# Patient Record
Sex: FEMALE | Race: BLACK OR AFRICAN AMERICAN | URBAN - METROPOLITAN AREA
[De-identification: names, ages, dates, MRNs, and addresses within clinical notes are randomized per-mention and may not be internally consistent; named-entity substitution may affect disease eponyms.]

---

## 2020-11-11 ENCOUNTER — OFFICE VISIT (OUTPATIENT)
Dept: URBAN - METROPOLITAN AREA CLINIC 92 | Facility: CLINIC | Age: 40
End: 2020-11-11
Payer: COMMERCIAL

## 2020-11-11 DIAGNOSIS — R19.5 LOOSE STOOLS: ICD-10-CM

## 2020-11-11 DIAGNOSIS — K62.5 RECTAL BLEEDING: ICD-10-CM

## 2020-11-11 DIAGNOSIS — R10.30 LOWER ABDOMINAL PAIN: ICD-10-CM

## 2020-11-11 DIAGNOSIS — Z86.2 HISTORY OF ANEMIA: ICD-10-CM

## 2020-11-11 DIAGNOSIS — R10.11 RUQ ABDOMINAL PAIN: ICD-10-CM

## 2020-11-11 PROCEDURE — G8417 CALC BMI ABV UP PARAM F/U: HCPCS | Performed by: PHYSICIAN ASSISTANT

## 2020-11-11 PROCEDURE — G9903 PT SCRN TBCO ID AS NON USER: HCPCS | Performed by: PHYSICIAN ASSISTANT

## 2020-11-11 PROCEDURE — G8482 FLU IMMUNIZE ORDER/ADMIN: HCPCS | Performed by: PHYSICIAN ASSISTANT

## 2020-11-11 PROCEDURE — G8427 DOCREV CUR MEDS BY ELIG CLIN: HCPCS | Performed by: PHYSICIAN ASSISTANT

## 2020-11-11 PROCEDURE — 99214 OFFICE O/P EST MOD 30 MIN: CPT | Performed by: PHYSICIAN ASSISTANT

## 2020-11-11 RX ORDER — HYOSCYAMINE SULFATE 0.12 MG/1
1 TABLET UNDER THE TONGUE AND ALLOW TO DISSOLVE  AS NEEDED TABLET, ORALLY DISINTEGRATING ORAL THREE TIMES A DAY
Qty: 60 | Refills: 0 | OUTPATIENT

## 2020-11-11 RX ORDER — SODIUM, POTASSIUM,MAG SULFATES 17.5-3.13G
177ML SOLUTION, RECONSTITUTED, ORAL ORAL
Qty: 1 | Refills: 0 | OUTPATIENT
Start: 2020-11-11

## 2020-11-11 NOTE — PHYSICAL EXAM GASTROINTESTINAL
Abdomen , soft, lower abd tenderness, nondistended , no guarding or rigidity , no masses palpable , normal bowel sounds , no hepatomegaly present

## 2020-11-12 ENCOUNTER — OFFICE VISIT (OUTPATIENT)
Dept: URBAN - METROPOLITAN AREA CLINIC 91 | Facility: CLINIC | Age: 40
End: 2020-11-12
Payer: COMMERCIAL

## 2020-11-12 DIAGNOSIS — R10.11 RUQ ABDOMINAL PAIN: ICD-10-CM

## 2020-11-12 LAB
A/G RATIO: 1.7
ALBUMIN: 5
ALKALINE PHOSPHATASE: 57
ALT (SGPT): 14
AST (SGOT): 21
BILIRUBIN, TOTAL: 0.3
BUN/CREATININE RATIO: 8
BUN: 6
CALCIUM: 9.9
CARBON DIOXIDE, TOTAL: 20
CHLORIDE: 103
CREATININE: 0.75
EGFR IF AFRICN AM: 117
EGFR IF NONAFRICN AM: 101
FERRITIN, SERUM: 64
GLOBULIN, TOTAL: 3
GLUCOSE: 90
HEMATOCRIT: 44.3
HEMOGLOBIN: 14.6
IRON BIND.CAP.(TIBC): 415
IRON SATURATION: 25
IRON: 102
MCH: 27.5
MCHC: 33
MCV: 84
NRBC: (no result)
PLATELETS: 242
POTASSIUM: 4.3
PROTEIN, TOTAL: 8
RBC: 5.3
RDW: 13.3
SODIUM: 139
TSH: 2.38
UIBC: 313
WBC: 7

## 2020-11-12 PROCEDURE — 76705 ECHO EXAM OF ABDOMEN: CPT | Performed by: INTERNAL MEDICINE

## 2020-11-12 RX ORDER — HYOSCYAMINE SULFATE 0.12 MG/1
1 TABLET UNDER THE TONGUE AND ALLOW TO DISSOLVE  AS NEEDED TABLET, ORALLY DISINTEGRATING ORAL THREE TIMES A DAY
Qty: 60 | Refills: 0 | Status: ACTIVE | COMMUNITY

## 2020-11-12 RX ORDER — SODIUM, POTASSIUM,MAG SULFATES 17.5-3.13G
177ML SOLUTION, RECONSTITUTED, ORAL ORAL
Qty: 1 | Refills: 0 | Status: ACTIVE | COMMUNITY
Start: 2020-11-11

## 2020-11-13 ENCOUNTER — CLAIMS CREATED FROM THE CLAIM WINDOW (OUTPATIENT)
Dept: URBAN - METROPOLITAN AREA CLINIC 4 | Facility: CLINIC | Age: 40
End: 2020-11-13
Payer: COMMERCIAL

## 2020-11-13 ENCOUNTER — OFFICE VISIT (OUTPATIENT)
Dept: URBAN - METROPOLITAN AREA SURGERY CENTER 16 | Facility: SURGERY CENTER | Age: 40
End: 2020-11-13
Payer: COMMERCIAL

## 2020-11-13 DIAGNOSIS — K63.89 OTHER SPECIFIED DISEASES OF INTESTINE: ICD-10-CM

## 2020-11-13 DIAGNOSIS — R19.4 ALTERED BOWEL HABITS: ICD-10-CM

## 2020-11-13 DIAGNOSIS — K92.1 BLACK STOOL: ICD-10-CM

## 2020-11-13 PROCEDURE — 88305 TISSUE EXAM BY PATHOLOGIST: CPT | Performed by: PATHOLOGY

## 2020-11-13 PROCEDURE — G9937 DIG OR SURV COLSCO: HCPCS | Performed by: INTERNAL MEDICINE

## 2020-11-13 PROCEDURE — 45380 COLONOSCOPY AND BIOPSY: CPT | Performed by: INTERNAL MEDICINE

## 2020-11-13 PROCEDURE — G8907 PT DOC NO EVENTS ON DISCHARG: HCPCS | Performed by: INTERNAL MEDICINE

## 2020-11-13 RX ORDER — HYOSCYAMINE SULFATE 0.12 MG/1
1 TABLET UNDER THE TONGUE AND ALLOW TO DISSOLVE  AS NEEDED TABLET, ORALLY DISINTEGRATING ORAL THREE TIMES A DAY
Qty: 60 | Refills: 0 | Status: ACTIVE | COMMUNITY

## 2020-11-13 RX ORDER — SODIUM, POTASSIUM,MAG SULFATES 17.5-3.13G
177ML SOLUTION, RECONSTITUTED, ORAL ORAL
Qty: 1 | Refills: 0 | Status: ACTIVE | COMMUNITY
Start: 2020-11-11

## 2020-11-24 ENCOUNTER — TELEPHONE ENCOUNTER (OUTPATIENT)
Dept: URBAN - METROPOLITAN AREA CLINIC 92 | Facility: CLINIC | Age: 40
End: 2020-11-24

## 2020-11-30 ENCOUNTER — LAB OUTSIDE AN ENCOUNTER (OUTPATIENT)
Dept: URBAN - METROPOLITAN AREA CLINIC 92 | Facility: CLINIC | Age: 40
End: 2020-11-30

## 2020-12-01 ENCOUNTER — LAB OUTSIDE AN ENCOUNTER (OUTPATIENT)
Dept: URBAN - METROPOLITAN AREA CLINIC 92 | Facility: CLINIC | Age: 40
End: 2020-12-01

## 2020-12-02 ENCOUNTER — TELEPHONE ENCOUNTER (OUTPATIENT)
Dept: URBAN - METROPOLITAN AREA CLINIC 92 | Facility: CLINIC | Age: 40
End: 2020-12-02

## 2020-12-02 LAB — H PYLORI BREATH TEST: NEGATIVE

## 2020-12-02 RX ORDER — OMEPRAZOLE 40 MG/1
1 CAPSULE 30 MINUTES BEFORE MORNING MEAL CAPSULE, DELAYED RELEASE ORAL ONCE A DAY
Qty: 90 | Refills: 3 | OUTPATIENT
Start: 2020-12-02

## 2020-12-07 LAB
GASTROINTESTINAL PATHOGEN: (no result)
OVA AND PARASITE - QDX: (no result)
PANCREATICELASTASE ELISA, STOOL: (no result)

## 2020-12-08 ENCOUNTER — TELEPHONE ENCOUNTER (OUTPATIENT)
Dept: URBAN - METROPOLITAN AREA CLINIC 92 | Facility: CLINIC | Age: 40
End: 2020-12-08

## 2020-12-08 ENCOUNTER — OFFICE VISIT (OUTPATIENT)
Dept: URBAN - METROPOLITAN AREA TELEHEALTH 2 | Facility: TELEHEALTH | Age: 40
End: 2020-12-08
Payer: COMMERCIAL

## 2020-12-08 ENCOUNTER — OFFICE VISIT (OUTPATIENT)
Dept: URBAN - METROPOLITAN AREA TELEHEALTH 2 | Facility: TELEHEALTH | Age: 40
End: 2020-12-08

## 2020-12-08 DIAGNOSIS — R63.4 WEIGHT LOSS: ICD-10-CM

## 2020-12-08 DIAGNOSIS — K58.0 IRRITABLE BOWEL SYNDROME WITH DIARRHEA: ICD-10-CM

## 2020-12-08 DIAGNOSIS — R10.84 GENERALIZED ABDOMINAL PAIN: ICD-10-CM

## 2020-12-08 DIAGNOSIS — R19.5 LOOSE STOOLS: ICD-10-CM

## 2020-12-08 PROBLEM — 301717006: Status: ACTIVE | Noted: 2020-11-11

## 2020-12-08 PROBLEM — 162031009: Status: ACTIVE | Noted: 2020-11-24

## 2020-12-08 PROBLEM — 54586004: Status: ACTIVE | Noted: 2020-12-08

## 2020-12-08 PROBLEM — 197125005: Status: ACTIVE | Noted: 2020-12-08

## 2020-12-08 PROBLEM — 12063002: Status: ACTIVE | Noted: 2020-11-11

## 2020-12-08 PROBLEM — 275538002: Status: ACTIVE | Noted: 2020-11-11

## 2020-12-08 PROCEDURE — 1036F TOBACCO NON-USER: CPT | Performed by: INTERNAL MEDICINE

## 2020-12-08 PROCEDURE — G8482 FLU IMMUNIZE ORDER/ADMIN: HCPCS | Performed by: INTERNAL MEDICINE

## 2020-12-08 PROCEDURE — 99214 OFFICE O/P EST MOD 30 MIN: CPT | Performed by: INTERNAL MEDICINE

## 2020-12-08 PROCEDURE — G9903 PT SCRN TBCO ID AS NON USER: HCPCS | Performed by: INTERNAL MEDICINE

## 2020-12-08 PROCEDURE — G8420 CALC BMI NORM PARAMETERS: HCPCS | Performed by: INTERNAL MEDICINE

## 2020-12-08 PROCEDURE — G8427 DOCREV CUR MEDS BY ELIG CLIN: HCPCS | Performed by: INTERNAL MEDICINE

## 2020-12-08 RX ORDER — HYOSCYAMINE SULFATE 0.12 MG/1
1 TABLET UNDER THE TONGUE AND ALLOW TO DISSOLVE  AS NEEDED TABLET, ORALLY DISINTEGRATING ORAL THREE TIMES A DAY
Qty: 60 | Refills: 0 | Status: DISCONTINUED | COMMUNITY

## 2020-12-08 RX ORDER — SODIUM, POTASSIUM,MAG SULFATES 17.5-3.13G
177ML SOLUTION, RECONSTITUTED, ORAL ORAL
Qty: 1 | Refills: 0 | Status: DISCONTINUED | COMMUNITY
Start: 2020-11-11

## 2020-12-08 RX ORDER — AMITRIPTYLINE HYDROCHLORIDE 10 MG/1
1 TABLET AT BEDTIME TABLET, FILM COATED ORAL ONCE A DAY
Qty: 90 | Refills: 0 | OUTPATIENT
Start: 2020-12-08

## 2020-12-08 NOTE — HPI-OTHER HISTORIES
The patient is a 40 year old /Black female seen by Dr. Penn in Feb for rectal bleeding and then presented recently w/ pulsing migratory abd area pain, loose bowels, nausea, 5# weight loss, rectal itching and bleeding. Grandfather with Colon CA She was diagnosed with anemia about 2 years ago, ferritin 8. She had fibroids s/p myomecty last year. She does have chronic fatigue.  She called last week and noted new onset indigestion, H pylori test negative.  Colon 11/13/2020 IH ow normal, bx neg colitis RUQ US 11/2020 WNL GPP negative Taking culturelle some days but still with BMs 3/day, mostly all loose. MIld epi discomfort with rare nausea but no vomiting. Still has indigestion once/week and has not started PPI. Most worried about loose stools and weight loss-down 10# since onset of sx. +Anxiety. recent ER visit with neg cardiac workup. Put on klonopin

## 2020-12-16 ENCOUNTER — TELEPHONE ENCOUNTER (OUTPATIENT)
Dept: URBAN - METROPOLITAN AREA CLINIC 92 | Facility: CLINIC | Age: 40
End: 2020-12-16

## 2020-12-16 PROBLEM — 84229001: Status: ACTIVE | Noted: 2020-12-16

## 2020-12-18 ENCOUNTER — OFFICE VISIT (OUTPATIENT)
Dept: URBAN - METROPOLITAN AREA CLINIC 91 | Facility: CLINIC | Age: 40
End: 2020-12-18
Payer: COMMERCIAL

## 2020-12-18 DIAGNOSIS — R10.84 ABDOMINAL CRAMPING, GENERALIZED: ICD-10-CM

## 2020-12-18 DIAGNOSIS — R19.5 LOOSE STOOLS: ICD-10-CM

## 2020-12-18 PROCEDURE — 91065 BREATH HYDROGEN/METHANE TEST: CPT | Performed by: INTERNAL MEDICINE

## 2020-12-22 LAB
C-REACTIVE PROTEIN, QUANT: <1
HEMOGLOBIN A1C: 5.4
HOMOCYST(E)INE: 6.4
Lab: 39.6
MAGNESIUM: 2
MERCURY: <5
SELENIUM, SERUM/PLASMA: 118
VITAMIN B12: 1228
VITAMIN D, 25-HYDROXY: 50.6
ZINC, PLASMA OR SERUM: 91

## 2021-01-13 ENCOUNTER — OFFICE VISIT (OUTPATIENT)
Dept: URBAN - METROPOLITAN AREA CLINIC 92 | Facility: CLINIC | Age: 41
End: 2021-01-13
Payer: COMMERCIAL

## 2021-01-13 ENCOUNTER — DASHBOARD ENCOUNTERS (OUTPATIENT)
Age: 41
End: 2021-01-13

## 2021-01-13 DIAGNOSIS — R19.5 LOOSE STOOLS: ICD-10-CM

## 2021-01-13 DIAGNOSIS — R53.83 FATIGUE, UNSPECIFIED TYPE: ICD-10-CM

## 2021-01-13 DIAGNOSIS — R63.0 ANOREXIA: ICD-10-CM

## 2021-01-13 DIAGNOSIS — R63.4 WEIGHT LOSS: ICD-10-CM

## 2021-01-13 DIAGNOSIS — F41.9 ANXIETY: ICD-10-CM

## 2021-01-13 PROBLEM — 48694002: Status: ACTIVE | Noted: 2021-01-13

## 2021-01-13 PROBLEM — 398032003: Status: ACTIVE | Noted: 2020-11-11

## 2021-01-13 PROBLEM — 79890006: Status: ACTIVE | Noted: 2021-01-13

## 2021-01-13 PROCEDURE — G9903 PT SCRN TBCO ID AS NON USER: HCPCS | Performed by: PHYSICIAN ASSISTANT

## 2021-01-13 PROCEDURE — G8482 FLU IMMUNIZE ORDER/ADMIN: HCPCS | Performed by: PHYSICIAN ASSISTANT

## 2021-01-13 PROCEDURE — G8427 DOCREV CUR MEDS BY ELIG CLIN: HCPCS | Performed by: PHYSICIAN ASSISTANT

## 2021-01-13 PROCEDURE — G8420 CALC BMI NORM PARAMETERS: HCPCS | Performed by: PHYSICIAN ASSISTANT

## 2021-01-13 PROCEDURE — 99214 OFFICE O/P EST MOD 30 MIN: CPT | Performed by: PHYSICIAN ASSISTANT

## 2021-01-13 RX ORDER — AMITRIPTYLINE HYDROCHLORIDE 10 MG/1
1 TABLET AT BEDTIME TABLET, FILM COATED ORAL ONCE A DAY
Qty: 90 | Refills: 0 | Status: ON HOLD | COMMUNITY
Start: 2020-12-08

## 2021-01-13 NOTE — PHYSICAL EXAM CONSTITUTIONAL:
well developed, well nourished , in no acute distress but anxious , ambulating without difficulty , normal communication ability, tearful as we discuss her anxiety-

## 2021-01-13 NOTE — HPI-OTHER HISTORIES
The patient is a 40yoAAF seen in Nov for migratory abd area pain, loose bowels, nausea, 5# weight loss, rectal itching and bleeding. Grandfather with Colon CA         She was diagnosed with anemia about 2 years ago, ferritin 8. She had fibroids s/p myomecty last year. She notes persistent chronic fatigue.  She has had an extensive workup including normal Celiac serology, neg H pylori test, Colonoscopy 11/13/2020 IH ow normal, bx neg colitis; RUQ US 11/2020 WNL; GPP negative 11/2020; SIBO neg  Despite culturelle continued to note loose stools with undigested food (elastase normal in GPP). Self started digestive enzymes which have H2B and has helped with 1-2 stools/day, some formed and some loose. She continues to have pain that starts in her R shoulder and radiated to her epigastric area--burning in nature. Occasional nausea but no vomiting. She has anorexia and has lost 10# sinve her last OV. She does note sign anxiety and recent ER visit with neg cardiac workup. Put on klonopin which helps with all her sx. Rx'd amitriptyline 10mg but did not respond with 2-3 days so stopped it.   Does not want to do CT due to radiation-concerned re pancreas and liver.

## 2021-02-25 ENCOUNTER — OFFICE VISIT (OUTPATIENT)
Dept: URBAN - METROPOLITAN AREA CLINIC 92 | Facility: CLINIC | Age: 41
End: 2021-02-25